# Patient Record
Sex: FEMALE | Race: WHITE | NOT HISPANIC OR LATINO | Employment: UNEMPLOYED | URBAN - METROPOLITAN AREA
[De-identification: names, ages, dates, MRNs, and addresses within clinical notes are randomized per-mention and may not be internally consistent; named-entity substitution may affect disease eponyms.]

---

## 2017-01-04 ENCOUNTER — OFFICE VISIT (OUTPATIENT)
Dept: URGENT CARE | Facility: CLINIC | Age: 57
End: 2017-01-04

## 2017-01-04 ENCOUNTER — APPOINTMENT (OUTPATIENT)
Dept: RADIOLOGY | Facility: IMAGING CENTER | Age: 57
End: 2017-01-04
Attending: FAMILY MEDICINE

## 2017-01-04 VITALS
DIASTOLIC BLOOD PRESSURE: 68 MMHG | RESPIRATION RATE: 17 BRPM | BODY MASS INDEX: 35.7 KG/M2 | WEIGHT: 194 LBS | HEART RATE: 112 BPM | TEMPERATURE: 100.8 F | HEIGHT: 62 IN | OXYGEN SATURATION: 98 % | SYSTOLIC BLOOD PRESSURE: 144 MMHG

## 2017-01-04 DIAGNOSIS — R05.9 COUGH: ICD-10-CM

## 2017-01-04 DIAGNOSIS — J44.1 COPD EXACERBATION (HCC): ICD-10-CM

## 2017-01-04 DIAGNOSIS — R07.89 CHEST WALL PAIN: ICD-10-CM

## 2017-01-04 DIAGNOSIS — R50.81 FEVER IN OTHER DISEASES: ICD-10-CM

## 2017-01-04 LAB
FLUAV+FLUBV AG SPEC QL IA: NORMAL
INT CON NEG: NORMAL
INT CON POS: NORMAL

## 2017-01-04 PROCEDURE — 94760 N-INVAS EAR/PLS OXIMETRY 1: CPT | Performed by: FAMILY MEDICINE

## 2017-01-04 PROCEDURE — 87804 INFLUENZA ASSAY W/OPTIC: CPT | Performed by: FAMILY MEDICINE

## 2017-01-04 PROCEDURE — 71020 DX-CHEST-2 VIEWS: CPT | Mod: TC | Performed by: FAMILY MEDICINE

## 2017-01-04 PROCEDURE — 99214 OFFICE O/P EST MOD 30 MIN: CPT | Performed by: FAMILY MEDICINE

## 2017-01-04 RX ORDER — DOXYCYCLINE HYCLATE 100 MG
100 TABLET ORAL 2 TIMES DAILY
Qty: 20 TAB | Refills: 0 | Status: SHIPPED | OUTPATIENT
Start: 2017-01-04

## 2017-01-04 RX ORDER — OXYCODONE HYDROCHLORIDE AND ACETAMINOPHEN 5; 325 MG/1; MG/1
1-2 TABLET ORAL EVERY 6 HOURS PRN
Qty: 15 TAB | Refills: 0 | Status: SHIPPED | OUTPATIENT
Start: 2017-01-04

## 2017-01-04 RX ORDER — PREDNISONE 20 MG/1
40 TABLET ORAL DAILY
Qty: 10 TAB | Refills: 0 | Status: SHIPPED | OUTPATIENT
Start: 2017-01-04 | End: 2017-01-09

## 2017-01-04 NOTE — MR AVS SNAPSHOT
"        Lynkranthi Villafana   2017 5:00 PM   Office Visit   MRN: [id number]    Department:  [facility]   Dept Phone:  [phone]    Description:  Female : 1960   Provider:  Jovan Maldonado M.D.           Reason for Visit     COPD chronic COPD, w/SOB and migraine      Allergies as of 2017     Allergen Noted Reactions    Hydrocodone-Acetaminophen 10/08/2014   Vomiting      You were diagnosed with     COPD exacerbation ([facility])   [526436]       Cough   [786.2.ICD-9-CM]       Fever in other diseases   [3910368]       Chest wall pain   [665374]         Vital Signs     Blood Pressure Pulse Temperature Respirations Height Weight    144/68 mmHg 112 38.2 °C (100.8 °F) 17 1.575 m (5' 2.01\") 87.998 kg (194 lb)    Body Mass Index Oxygen Saturation Breastfeeding? Smoking Status          35.47 kg/m2 98% No Former Smoker        Basic Information     Date Of Birth Sex Race Ethnicity Preferred Language    1960 Female White Non- English      Problem List              ICD-10-CM Priority Class Noted - Resolved    COPD (chronic obstructive pulmonary disease) ([facility]) J44.9   10/8/2014 - Present    Hypothyroid E03.9 Low  10/8/2014 - Present    Mood disorder (HCC) F39 Medium  10/8/2014 - Present    Edema extremities R60.0   10/8/2014 - Present    Hypokalemia E87.6 Medium  2015 - Present    Hyperglycemia R73.9 Medium  2015 - Present    Leukocytosis D72.829 Low  2015 - Present    Hypothyroidism E03.9 Low  2015 - Present    Anxiety and depression F41.9, F32.9 Low  2015 - Present    RLS (restless legs syndrome) G25.81   2015 - Present    Insomnia G47.00   2015 - Present    Breast lump N63   2015 - Present    Type 2 diabetes mellitus without complication (HCC) E11.9   2015 - Present      Health Maintenance        Date Due Completion Dates    IMM HEP B VACCINE (1 of 3 - Primary Series) 1960 ---    DIABETES MONOFILAMTENT / LE EXAM 1962 ---    RETINAL SCREENING " 10/1/1979 ---    IMM DTaP/Tdap/Td Vaccine (1 - Tdap) 10/1/1980 ---    IMM PNEUMOCOCCAL 19-64 (ADULT) MEDIUM RISK SERIES (1 of 1 - PPSV23) 10/1/1980 ---    MAMMOGRAM 10/8/2002 10/8/2001 (Prv Comp)    Override on 10/8/2001: Previously completed    A1C SCREENING 5/17/2016 11/17/2015, 3/17/2015    IMM INFLUENZA (1) 9/1/2016 10/15/2015, 3/16/2015    FASTING LIPID PROFILE 11/17/2016 11/17/2015, 12/22/2014    URINE ACR / MICROALBUMIN 11/17/2016 11/17/2015    SERUM CREATININE 1/29/2017 1/29/2016, 11/17/2015, 7/24/2015, 7/23/2015, 7/21/2015, 7/20/2015, 7/18/2015, 3/20/2015, 3/18/2015, 3/17/2015, 3/16/2015, 3/16/2015, 12/21/2014    COLONOSCOPY 4/1/2025 4/1/2015 (Declined)    Override on 4/1/2015: Patient Declined            Results     POCT Influenza A/B      Component    Rapid Influenza A-B    neg    Internal Control Positive    Valid    Internal Control Negative    Valid                        Current Immunizations     Influenza Vaccine Quad Inj (Pf) 3/16/2015  6:11 AM    Influenza Vaccine Quad Inj (Preserved) 10/15/2015    Pneumococcal Vaccine (PCV7) Historical Data 8/16/2011      Below and/or attached are the medications your provider expects you to take. Review all of your home medications and newly ordered medications with your provider and/or pharmacist. Follow medication instructions as directed by your provider and/or pharmacist. Please keep your medication list with you and share with your provider. Update the information when medications are discontinued, doses are changed, or new medications (including over-the-counter products) are added; and carry medication information at all times in the event of emergency situations     Allergies:  HYDROCODONE-ACETAMINOPHEN - Vomiting               Medications  Valid as of: January 04, 2017 -  6:05 PM    Generic Name Brand Name Tablet Size Instructions for use    Albuterol Sulfate (Nebu Soln) PROVENTIL 2.5mg/3ml USE ONE VIAL IN NEBULIZER EVERY 6 HOURS AS NEEDED FOR SHORTNESS  OF BREATH        Albuterol Sulfate (Aero Soln) albuterol 108 (90 BASE) MCG/ACT Inhale 2 Puffs by mouth every 6 hours as needed. Shortness of Breath        Doxycycline Hyclate (Tab) VIBRAMYCIN 100 MG Take 1 Tab by mouth 2 times a day.        FLUoxetine HCl (Cap) PROZAC 20 MG TAKE ONE CAPSULE BY MOUTH ONCE DAILY        FLUoxetine HCl (Cap) PROZAC 40 MG Take 1 Cap by mouth every day.        Furosemide (Tab) LASIX 40 MG TAKE ONE TABLET BY MOUTH ONCE DAILY        Levothyroxine Sodium (Tab) SYNTHROID 150 MCG TAKE ONE TABLET BY MOUTH ONCE DAILY IN THE MORNING BEFORE BREAKFAST        MetFORMIN HCl (Tab) GLUCOPHAGE 500 MG Take 1 Tab by mouth every day.        Oxycodone-Acetaminophen (Tab) PERCOCET 5-325 MG Take 1-2 Tabs by mouth every 6 hours as needed.        Potassium Chloride Rain CR (Tab CR) K-DUR 10 MEQ Take 10 mEq by mouth 2 times a day.        PredniSONE (Tab) DELTASONE 20 MG Take 2 tablets by mouth daily for 7 days.        PredniSONE (Tab) DELTASONE 20 MG Take 2 Tabs by mouth every day for 5 days.        RaNITidine HCl (Tab) ZANTAC 150 MG Take 150 mg by mouth every day.        Temazepam (Cap) RESTORIL 15 MG Take 1 Cap by mouth at bedtime as needed for Sleep.        .                 Medicines prescribed today were sent to:     [facility] [address] [place] [address] [address]     [address] [facility] [address]    Phone: [phone] Fax: [phone]    Open 24 Hours?: No      Medication refill instructions:       If your prescription bottle indicates you have medication refills left, it is not necessary to call your provider’s office. Please contact your pharmacy and they will refill your medication.    If your prescription bottle indicates you do not have any refills left, you may request refills at any time through one of the following ways: The online baseclick system (except Urgent Care), by calling your provider’s office, or by asking your pharmacy to contact your provider’s office with a refill request. Medication  refills are processed only during regular business hours and may not be available until the next business day. Your provider may request additional information or to have a follow-up visit with you prior to refilling your medication.   *Please Note: Medication refills are assigned a new Rx number when refilled electronically. Your pharmacy may indicate that no refills were authorized even though a new prescription for the same medication is available at the pharmacy. Please request the medicine by name with the pharmacy before contacting your provider for a refill.        Your To Do List     Future Labs/Procedures Complete By Expires    DX-CHEST-2 VIEWS  As directed 1/4/2018         Aldis Access Code: [email or web address]  Expires: 2/3/2017  6:05 PM    Aldis  A secure, online tool to manage your health information     [facility]’s Aldis® is a secure, online tool that connects you to your personalized health information from the privacy of your home -- day or night - making it very easy for you to manage your healthcare. Once the activation process is completed, you can even access your medical information using the Aldis sherin, which is available for free in the Apple Sherin store or Google Play store.     Aldis provides the following levels of access (as shown below):   My Chart Features   [facility] Doctor [facility]ists [facility]  Urgent  Care [facility]  Primary Care  Doctor   Email your healthcare team securely and privately 24/7 X X X    Manage appointments: schedule your next appointment; view details of past/upcoming appointments X      Request prescription refills. X      View recent personal medical records, including lab and immunizations X X X X   View health record, including health history, allergies, medications X X X X   Read reports about your outpatient visits, procedures, consult and ER notes X X X X   See your discharge summary, which is a recap of your hospital and/or ER visit  that includes your diagnosis, lab results, and care plan. X X       How to register for [employer]:  1. Go to  [email or web address].  2. Click on the Sign Up Now box, which takes you to the New Member Sign Up page. You will need to provide the following information:  a. Enter your [employer] Access Code exactly as it appears at the top of this page. (You will not need to use this code after you’ve completed the sign-up process. If you do not sign up before the expiration date, you must request a new code.)   b. Enter your date of birth.   c. Enter your home email address.   d. Click Submit, and follow the next screen’s instructions.  3. Create a [employer] ID. This will be your [employer] login ID and cannot be changed, so think of one that is secure and easy to remember.  4. Create a [employer] password. You can change your password at any time.  5. Enter your Password Reset Question and Answer. This can be used at a later time if you forget your password.   6. Enter your e-mail address. This allows you to receive e-mail notifications when new information is available in [employer].  7. Click Sign Up. You can now view your health information.    For assistance activating your [employer] account, call [phone]

## 2017-01-08 ASSESSMENT — ENCOUNTER SYMPTOMS
SHORTNESS OF BREATH: 1
COUGH: 1
SPUTUM PRODUCTION: 1
EYE DISCHARGE: 0
EYE REDNESS: 0

## 2017-01-08 ASSESSMENT — COPD QUESTIONNAIRES: COPD: 1

## 2017-01-09 NOTE — PROGRESS NOTES
"Subjective:      Lyn Villafana is a 56 y.o. female who presents with COPD            COPD  She complains of cough, shortness of breath and sputum production. This is a recurrent problem. Episode onset: 3-4 days progressively worse sx's. Subjective fever. +PMH COPD, +PMH pneumonia requiring hospitalization. +fatigue. Minimal nasal congestion and  ST. Severe right rib pain with cough. No limb swelling.        Review of Systems   Eyes: Negative for discharge and redness.   Respiratory: Positive for cough, sputum production and shortness of breath.    Skin: Negative for itching and rash.          Objective:     /68 mmHg  Pulse 112  Temp(Src) 38.2 °C (100.8 °F)  Resp 17  Ht 1.575 m (5' 2.01\")  Wt 87.998 kg (194 lb)  BMI 35.47 kg/m2  SpO2 98%  Breastfeeding? No     Physical Exam   Constitutional: She appears well-developed and well-nourished. No distress.   HENT:   Head: Normocephalic and atraumatic.   Right Ear: External ear normal.   Left Ear: External ear normal.   Nose: Nose normal.   Mouth/Throat: Oropharynx is clear and moist.   Eyes: Conjunctivae are normal.   Neck: Neck supple. No JVD present. No tracheal deviation present.   Cardiovascular: Regular rhythm and normal heart sounds.    rapid   Pulmonary/Chest: Effort normal.   Expiratory wheeze. Moving air adequately.   Musculoskeletal: She exhibits no edema.   Neurological:   Speech is clear. Patient is appropriate and cooperative.                 Assessment/Plan:   CXR: no acute cardiopulmonary process by my read, radiology pending.  Pulse ox adequate  Influenza negative    1. COPD exacerbation (HCC)    - doxycycline (VIBRAMYCIN) 100 MG Tab; Take 1 Tab by mouth 2 times a day.  Dispense: 20 Tab; Refill: 0  - predniSONE (DELTASONE) 20 MG Tab; Take 2 Tabs by mouth every day for 5 days.  Dispense: 10 Tab; Refill: 0    2. Cough    - DX-CHEST-2 VIEWS; Future  - POCT Influenza A/B    3. Fever in other diseases    - DX-CHEST-2 VIEWS; Future  - POCT " Influenza A/B    4. Chest wall pain    - oxycodone-acetaminophen (PERCOCET) 5-325 MG Tab; Take 1-2 Tabs by mouth every 6 hours as needed.  Dispense: 15 Tab; Refill: 0

## 2019-01-08 LAB
ALBUMIN SERPL-MCNC: 3.7 G/DL (ref 3.4–5)
ALP SERPL-CCNC: 63 U/L (ref 45–117)
ALT SERPL-CCNC: 19 U/L (ref 12–78)
ANION GAP SERPL CALC-SCNC: 4 MMOL/L (ref 5–15)
BILIRUB SERPL-MCNC: 0.2 MG/DL (ref 0.2–1)
CALCIUM SERPL-MCNC: 9.1 MG/DL (ref 8.5–10.1)
CHLORIDE SERPL-SCNC: 100 MMOL/L (ref 98–107)
CREAT SERPL-MCNC: 0.81 MG/DL (ref 0.55–1.02)
MICROSCOPIC: (no result)
PROT SERPL-MCNC: 6.8 G/DL (ref 6.4–8.2)

## 2019-01-09 ENCOUNTER — HOSPITAL ENCOUNTER (OUTPATIENT)
Dept: HOSPITAL 8 - OUT | Age: 59
Discharge: HOME | End: 2019-01-09
Attending: UROLOGY
Payer: MEDICARE

## 2019-01-09 VITALS — HEIGHT: 61 IN | BODY MASS INDEX: 36.5 KG/M2 | WEIGHT: 193.35 LBS

## 2019-01-09 VITALS — DIASTOLIC BLOOD PRESSURE: 84 MMHG | SYSTOLIC BLOOD PRESSURE: 137 MMHG

## 2019-01-09 DIAGNOSIS — Z98.890: ICD-10-CM

## 2019-01-09 DIAGNOSIS — Z79.84: ICD-10-CM

## 2019-01-09 DIAGNOSIS — Z90.49: ICD-10-CM

## 2019-01-09 DIAGNOSIS — Z87.891: ICD-10-CM

## 2019-01-09 DIAGNOSIS — J44.9: ICD-10-CM

## 2019-01-09 DIAGNOSIS — E11.9: ICD-10-CM

## 2019-01-09 DIAGNOSIS — Z88.8: ICD-10-CM

## 2019-01-09 DIAGNOSIS — N20.0: Primary | ICD-10-CM

## 2019-01-09 DIAGNOSIS — Z90.710: ICD-10-CM

## 2019-01-09 PROCEDURE — 50590 FRAGMENTING OF KIDNEY STONE: CPT

## 2019-01-09 PROCEDURE — 93005 ELECTROCARDIOGRAM TRACING: CPT

## 2019-01-09 PROCEDURE — 80053 COMPREHEN METABOLIC PANEL: CPT

## 2019-01-09 PROCEDURE — 36415 COLL VENOUS BLD VENIPUNCTURE: CPT

## 2019-01-09 PROCEDURE — 87086 URINE CULTURE/COLONY COUNT: CPT

## 2019-01-09 PROCEDURE — 82962 GLUCOSE BLOOD TEST: CPT

## 2019-01-09 PROCEDURE — 81001 URINALYSIS AUTO W/SCOPE: CPT

## 2021-03-15 DIAGNOSIS — Z23 NEED FOR VACCINATION: ICD-10-CM

## 2022-12-09 ENCOUNTER — APPOINTMENT (OUTPATIENT)
Dept: URGENT CARE | Facility: PHYSICIAN GROUP | Age: 62
End: 2022-12-09